# Patient Record
Sex: FEMALE | Race: AMERICAN INDIAN OR ALASKA NATIVE
[De-identification: names, ages, dates, MRNs, and addresses within clinical notes are randomized per-mention and may not be internally consistent; named-entity substitution may affect disease eponyms.]

---

## 2022-09-28 ENCOUNTER — HOSPITAL ENCOUNTER (EMERGENCY)
Dept: HOSPITAL 5 - ED | Age: 47
LOS: 1 days | Discharge: HOME | End: 2022-09-29
Payer: COMMERCIAL

## 2022-09-28 DIAGNOSIS — Z98.890: ICD-10-CM

## 2022-09-28 DIAGNOSIS — I10: ICD-10-CM

## 2022-09-28 DIAGNOSIS — Z79.899: ICD-10-CM

## 2022-09-28 DIAGNOSIS — J40: Primary | ICD-10-CM

## 2022-09-28 DIAGNOSIS — E11.9: ICD-10-CM

## 2022-09-28 DIAGNOSIS — Z90.89: ICD-10-CM

## 2022-09-28 PROCEDURE — 96372 THER/PROPH/DIAG INJ SC/IM: CPT

## 2022-09-28 PROCEDURE — 99283 EMERGENCY DEPT VISIT LOW MDM: CPT

## 2022-09-28 PROCEDURE — 71045 X-RAY EXAM CHEST 1 VIEW: CPT

## 2022-09-28 PROCEDURE — 94640 AIRWAY INHALATION TREATMENT: CPT

## 2022-09-28 NOTE — XRAY REPORT
CHEST 1 VIEW 9/28/2022 9:11 PM



INDICATION / CLINICAL INFORMATION: COUGH.



COMPARISON: None available.



FINDINGS:



SUPPORT DEVICES: None.

HEART / MEDIASTINUM: No significant abnormality. 

LUNGS / PLEURA: Mild diffuse interstitial prominence. No pneumothorax. 



ADDITIONAL FINDINGS: No significant additional findings.



IMPRESSION:

1. Diffuse interstitial prominence which may be related to pulmonary edema or possibly infection.



Signer Name: Xavier Crews MD 

Signed: 9/28/2022 9:43 PM

Workstation Name: [email or web address]

## 2022-09-28 NOTE — EMERGENCY DEPARTMENT REPORT
<BRIANMARISSA - Last Filed: 22 23:20>





ED General Adult HPI





- General


Chief complaint: Upper Respiratory Infection


Stated complaint: BRONCHITIS


Time Seen by Provider: 22 21:01


Source: patient


Mode of arrival: Ambulatory


Limitations: No Limitations





- History of Present Illness


Initial comments: 





47-year-old female former smoker with quit 6 months ago with past medical 

history of chronic recurrent bronchitis and hypertension presents emergency 

department complaining of flareup of cough congestion and wheezing with this 

weather/seasonal changing.  She reports no hemoptysis symptoms hematochezia, no 

nausea vomiting, there is mucus production.  


-: Gradual


Radiation: non-radiation


Quality: dull


Consistency: constant


Improves with: none


Worsens with: none


Associated Symptoms: cough, shortness of breath.  denies: chest pain, 

diaphoresis, loss of appetite, malaise, nausea/vomiting, rash, syncope, weakness


Treatments Prior to Arrival: none





- Related Data


                                  Previous Rx's











 Medication  Instructions  Recorded  Last Taken  Type


 


Albuterol Mdi (or & Nicu Only) 1 puff IH Q4-6H PRN #1 inha 22 Unknown Rx





[ProAir HFA Inhaler]    


 


Azithromycin [Zithromax] 500 mg PO QDAY #5 tablet 22 Unknown Rx


 


Benzonatate [Tessalon Perles] 100 mg PO Q8HR #20 capsule 22 Unknown Rx


 


predniSONE [Deltasone] 50 mg PO QDAY #5 tab 22 Unknown Rx











                                    Allergies











Allergy/AdvReac Type Severity Reaction Status Date / Time


 


No Known Allergies Allergy   Unverified 22 20:44














ED Review of Systems


Comment: All other systems reviewed and negative





ED Past Medical Hx





- Past Medical History


Previous Medical History?: Yes


Hx Hypertension: Yes


Hx Diabetes: Yes


Additional medical history: PERICARDITIS, CHRONIC BRONCHITIS





- Surgical History


Past Surgical History?: Yes


Additional Surgical History: , TOTAL HYSTERECTOMY, T&A, UMBILICAL 

HERNIA





- Social History


Smoking Status: Never Smoker


Substance Use Type: None





- Medications


Home Medications: 


                                Home Medications











 Medication  Instructions  Recorded  Confirmed  Last Taken  Type


 


Albuterol Mdi (or & Nicu Only) 1 puff IH Q4-6H PRN #1 inha 22  Unknown Rx





[ProAir HFA Inhaler]     


 


Azithromycin [Zithromax] 500 mg PO QDAY #5 tablet 22  Unknown Rx


 


Benzonatate [Tessalon Perles] 100 mg PO Q8HR #20 capsule 22  Unknown Rx


 


predniSONE [Deltasone] 50 mg PO QDAY #5 tab 22  Unknown Rx














ED Physical Exam





- General


Limitations: No Limitations


General appearance: alert, in no apparent distress





- Head


Head exam: Present: atraumatic, normocephalic





- Eye


Eye exam: Present: normal appearance, PERRL, EOMI


Pupils: Present: normal accommodation





- ENT


ENT exam: Present: mucous membranes moist





- Neck


Neck exam: Present: normal inspection





- Respiratory


Respiratory exam: Present: normal lung sounds bilaterally, wheezes, rhonchi.  

Absent: respiratory distress





- Cardiovascular


Cardiovascular Exam: Present: regular rate, normal rhythm.  Absent: systolic 

murmur, diastolic murmur, rubs, gallop





- GI/Abdominal


GI/Abdominal exam: Present: soft, normal bowel sounds





- Extremities Exam


Extremities exam: Present: normal inspection, normal capillary refill





- Back Exam


Back exam: Present: normal inspection





- Neurological Exam


Neurological exam: Present: alert, oriented X3





- Psychiatric


Psychiatric exam: Present: normal affect, normal mood





- Skin


Skin exam: Present: warm, dry, intact, normal color.  Absent: rash





ED Medical Decision Making





- Radiology Data


Radiology results: report reviewed





[address] [address]  


 


                                            XRay Report   


                                               Signed  


 


Patient: RICKY JAEGER                                                         

       MR#: [id number]  


62          


: 1975                                                                

Acct:[id number]      


 


Age/Sex: 47 / F                                                                

ADM Date: 22     


 


Loc: ED       


Attending Dr:   


 


 


Ordering Physician: GIORGIO KELLER MD  


Date of Service: 22  


Procedure(s): XR chest 1V ap  


Accession Number(s): [id number]  


 


cc: GIORGIO KELLER MD   


 


Fluoro Time In Minutes:   


 


CHEST 1 VIEW 2022 9:11 PM  


 


 INDICATION / CLINICAL INFORMATION: COUGH.  


 


 COMPARISON: None available.  


 


 FINDINGS:  


 


 SUPPORT DEVICES: None.  


 HEART / MEDIASTINUM: No significant abnormality.   


 LUNGS / PLEURA: Mild diffuse interstitial prominence. No pneumothorax.   


 


 ADDITIONAL FINDINGS: No significant additional findings.  


 


 IMPRESSION:  


 1. Diffuse interstitial prominence which may be related to pulmonary edema or 

possibly infection.  


 


 Signer Name: Nikki Crews MD   


 Signed: 2022 9:43 PM  


 Workstation Name: [email or web address]   


 


 


Transcribed By: AS  


Dictated By: NIKKI CREWS MD  


Electronically Authenticated By: NIKKI CREWS MD    


Signed Date/Time: 22                                


 


 


 


DD/DT: 22                                                            

  


TD/TT:





- Medical Decision Making





This patient presents with acute cough, most consistent with bronchitis. 

Differential diagnosis includes hyperactive airway disease, bronchitis, wheeze. 

Presentation not consistent with acute bacterial pneumonia, influenza, asthma, 

transient airway hyperresponsiveness. Presentation not consistent with chronic 

causes of cough (including GERD, asthma, postnasal discharge, medication side 

effect, CHF, lung cancer or mass).





Plan: CXR, supportive care, reassess





ED Disposition


Clinical Impression: 


 Cough, Bronchitis





Disposition: 01 HOME / SELF CARE / HOMELESS


Is pt being admited?: No


Does the pt Need Aspirin: No


Condition: Stable


Instructions:  Cool Mist Vaporizer, How to Use a Metered Dose Inhaler, Cough, 

Adult, How to Use a Dry Powder Inhaler, Easy-to-Read, Chronic Bronchitis (ED)


Additional Instructions: 


You were evaluated emergency department today for cough.  Chest x-ray did not 

show any evidence of any pneumonia and your cough is most likely due to a viral 

illness which will improve on its own with rest and fluids.  Due to the duration

 of your symptoms and the markings on x-ray you have been prescribed Zithromax 

please complete this regimen you can take over-the-counter medications such as 

various over-the-counter cough medications and also some decongestions to help 

diurese your symptoms.





Please schedule an appointment to follow-up with your primary care physician 

within 2 days





Return to emergency department if you expands worsening cough, fever 101 100.4 

or greater, recurrent vomiting, chest pain, shortness of breath or other 

concerning problems


Prescriptions: 


predniSONE [Deltasone] 50 mg PO QDAY #5 tab


Albuterol Mdi (or & Nicu Only) [ProAir HFA Inhaler] 1 puff IH Q4-6H PRN #1 inha


 PRN Reason: Cough


Benzonatate [Tessalon Perles] 100 mg PO Q8HR #20 capsule


Azithromycin [Zithromax] 500 mg PO QDAY #5 tablet


Referrals: 


JOSE KELLER MD [Other] - 3-5 Days





<AILEEN GOMES - Last Filed: 22 00:30>





ED Review of Systems


ROS: 


Stated complaint: BRONCHITIS


Other details as noted in HPI








ED Course





                                   Vital Signs











  22





  20:44 21:06 23:59


 


Temperature 98.3 F  


 


Pulse Rate 95 H  76


 


Respiratory 16  14





Rate   


 


Blood Pressure 204/101 204/101 


 


Blood Pressure   175/94





[Left]   


 


O2 Sat by Pulse 100  100





Oximetry   














ED Medical Decision Making





- Medical Decision Making


This  patient  was seen independently by the midlevel provider. I was available 

for consult however I was not involved in the decision making or the disposition

 of this patient.


Aileen Gomes


Critical care attestation.: 


If time is entered above; I have spent that time in minutes in the direct care 

of this critically ill patient, excluding procedure time.








ED Disposition


Is pt being admited?: No

## 2022-09-29 VITALS — DIASTOLIC BLOOD PRESSURE: 94 MMHG | SYSTOLIC BLOOD PRESSURE: 175 MMHG
